# Patient Record
Sex: MALE | Race: WHITE | URBAN - METROPOLITAN AREA
[De-identification: names, ages, dates, MRNs, and addresses within clinical notes are randomized per-mention and may not be internally consistent; named-entity substitution may affect disease eponyms.]

---
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
## 2024-01-16 ENCOUNTER — OFFICE VISIT (OUTPATIENT)
Age: 24
End: 2024-01-16

## 2024-01-16 VITALS
BODY MASS INDEX: 25.47 KG/M2 | OXYGEN SATURATION: 98 % | HEART RATE: 64 BPM | RESPIRATION RATE: 16 BRPM | TEMPERATURE: 97.6 F | WEIGHT: 188 LBS | HEIGHT: 72 IN | SYSTOLIC BLOOD PRESSURE: 120 MMHG | DIASTOLIC BLOOD PRESSURE: 74 MMHG

## 2024-01-16 DIAGNOSIS — A15.9 TUBERCULOSIS: ICD-10-CM

## 2024-01-16 DIAGNOSIS — Z29.8 NEED FOR MALARIA PROPHYLAXIS: Primary | ICD-10-CM

## 2024-01-16 PROBLEM — K21.9 GERD (GASTROESOPHAGEAL REFLUX DISEASE): Status: ACTIVE | Noted: 2020-01-02

## 2024-01-16 PROBLEM — F42.9 OCD (OBSESSIVE COMPULSIVE DISORDER): Status: ACTIVE | Noted: 2020-01-02

## 2024-01-16 PROCEDURE — 99203 OFFICE O/P NEW LOW 30 MIN: CPT | Performed by: NURSE PRACTITIONER

## 2024-01-16 PROCEDURE — 86580 TB INTRADERMAL TEST: CPT | Performed by: NURSE PRACTITIONER

## 2024-01-16 RX ORDER — ATOVAQUONE AND PROGUANIL HYDROCHLORIDE 250; 100 MG/1; MG/1
1 TABLET, FILM COATED ORAL DAILY
Qty: 16 TABLET | Refills: 0 | Status: SHIPPED | OUTPATIENT
Start: 2024-01-16

## 2024-01-16 RX ORDER — SERTRALINE HYDROCHLORIDE 100 MG/1
100 TABLET, FILM COATED ORAL DAILY
COMMUNITY

## 2024-01-16 SDOH — ECONOMIC STABILITY: FOOD INSECURITY: WITHIN THE PAST 12 MONTHS, THE FOOD YOU BOUGHT JUST DIDN'T LAST AND YOU DIDN'T HAVE MONEY TO GET MORE.: NEVER TRUE

## 2024-01-16 SDOH — ECONOMIC STABILITY: HOUSING INSECURITY
IN THE LAST 12 MONTHS, WAS THERE A TIME WHEN YOU DID NOT HAVE A STEADY PLACE TO SLEEP OR SLEPT IN A SHELTER (INCLUDING NOW)?: NO

## 2024-01-16 SDOH — ECONOMIC STABILITY: INCOME INSECURITY: HOW HARD IS IT FOR YOU TO PAY FOR THE VERY BASICS LIKE FOOD, HOUSING, MEDICAL CARE, AND HEATING?: NOT VERY HARD

## 2024-01-16 SDOH — ECONOMIC STABILITY: FOOD INSECURITY: WITHIN THE PAST 12 MONTHS, YOU WORRIED THAT YOUR FOOD WOULD RUN OUT BEFORE YOU GOT MONEY TO BUY MORE.: NEVER TRUE

## 2024-01-16 ASSESSMENT — PATIENT HEALTH QUESTIONNAIRE - PHQ9
SUM OF ALL RESPONSES TO PHQ QUESTIONS 1-9: 0
2. FEELING DOWN, DEPRESSED OR HOPELESS: 0
SUM OF ALL RESPONSES TO PHQ QUESTIONS 1-9: 0
SUM OF ALL RESPONSES TO PHQ9 QUESTIONS 1 & 2: 0
SUM OF ALL RESPONSES TO PHQ QUESTIONS 1-9: 0
1. LITTLE INTEREST OR PLEASURE IN DOING THINGS: 0
SUM OF ALL RESPONSES TO PHQ QUESTIONS 1-9: 0

## 2024-01-16 ASSESSMENT — ENCOUNTER SYMPTOMS
NAUSEA: 0
VOMITING: 0
SHORTNESS OF BREATH: 0
DIARRHEA: 0
ABDOMINAL PAIN: 0

## 2024-01-16 NOTE — PROGRESS NOTES
[facility] [place] [facility]  [facility] [place] [address] [facility] [address]  Dept: [phone]  Loc: [phone]     Stephen Emerson is a 23 y.o. male who presents today for:  Chief Complaint   Patient presents with    Medication Refill     Would like vaccines and malaria Tx for travel , TB testing        Assessment/Plan:     1. Tuberculosis  - Mantoux testing    2. Need for malaria prophylaxis  -discussed traveler's diarrhea treatment- pt declines azithromycin at this time.  advised pt take bismuth on trip.  Also discussed vaccines needed for travel to [place] including typhoid and yellow fever vaccines.  Advised pt avoid alcohol while taking Malarone.         No results found for any visits on 01/16/24.     Medications Ordered:  Orders Placed This Encounter   Medications    atovaquone-proguanil (MALARONE) 250-100 MG per tablet     Sig: Take 1 tablet by mouth daily Starting 1-2 days prior to entering endemic area, continuing throughout the stay and for 7 days after returning     Dispense:  16 tablet     Refill:  0       No follow-ups on file.    No future appointments.    HPI:   Pt presents for malaria prophylaxis for upcoming travel to [place] during Spring Break- traveling with [employer].  Pt is P3.  Denies h/o kidney or liver problems.  Declines travelers diarrhea treatment.  Will be in [place] x7 days.    Medication Refill  Pertinent negatives include no abdominal pain, chest pain, fever, headaches, nausea, numbness, rash or vomiting.         Subjective:      Review of Systems   Constitutional:  Negative for fever.   Respiratory:  Negative for shortness of breath.    Cardiovascular:  Negative for chest pain.   Gastrointestinal:  Negative for abdominal pain, diarrhea, nausea and vomiting.   Genitourinary:  Negative for difficulty urinating, dysuria and flank pain.   Skin:  Negative for rash.   Neurological:  Negative for dizziness, numbness and headaches.         Objective:

## 2024-01-18 ENCOUNTER — NURSE ONLY (OUTPATIENT)
Age: 24
End: 2024-01-18

## 2024-01-18 DIAGNOSIS — Z11.1 ENCOUNTER FOR PPD SKIN TEST READING: Primary | ICD-10-CM

## 2024-01-18 LAB
INDURATION: NORMAL
TB SKIN TEST: NEGATIVE

## 2024-01-18 PROCEDURE — 99999 PR OFFICE/OUTPT VISIT,PROCEDURE ONLY: CPT | Performed by: NURSE PRACTITIONER

## 2024-01-23 ENCOUNTER — NURSE ONLY (OUTPATIENT)
Age: 24
End: 2024-01-23

## 2024-01-23 DIAGNOSIS — Z11.1 ENCOUNTER FOR TUBERCULIN SKIN TEST: Primary | ICD-10-CM

## 2024-01-23 PROCEDURE — 86580 TB INTRADERMAL TEST: CPT | Performed by: NURSE PRACTITIONER

## 2024-01-25 ENCOUNTER — NURSE ONLY (OUTPATIENT)
Age: 24
End: 2024-01-25

## 2024-01-25 LAB
INDURATION: NORMAL
TB SKIN TEST: NEGATIVE

## 2024-01-25 PROCEDURE — 99999 PR OFFICE/OUTPT VISIT,PROCEDURE ONLY: CPT | Performed by: NURSE PRACTITIONER
